# Patient Record
Sex: FEMALE | Race: WHITE | Employment: OTHER | URBAN - METROPOLITAN AREA
[De-identification: names, ages, dates, MRNs, and addresses within clinical notes are randomized per-mention and may not be internally consistent; named-entity substitution may affect disease eponyms.]

---

## 2023-03-19 ENCOUNTER — HOSPITAL ENCOUNTER (EMERGENCY)
Age: 68
Discharge: HOME | End: 2023-03-20
Attending: STUDENT IN AN ORGANIZED HEALTH CARE EDUCATION/TRAINING PROGRAM
Payer: MEDICARE

## 2023-03-19 VITALS
OXYGEN SATURATION: 100 % | WEIGHT: 200 LBS | TEMPERATURE: 98 F | HEIGHT: 64 IN | DIASTOLIC BLOOD PRESSURE: 92 MMHG | BODY MASS INDEX: 34.15 KG/M2 | RESPIRATION RATE: 20 BRPM | HEART RATE: 100 BPM | SYSTOLIC BLOOD PRESSURE: 165 MMHG

## 2023-03-19 DIAGNOSIS — I88.9 LYMPHADENITIS: Primary | ICD-10-CM

## 2023-03-19 PROCEDURE — 99283 EMERGENCY DEPT VISIT LOW MDM: CPT

## 2023-03-19 RX ORDER — ATENOLOL 50 MG/1
50 TABLET ORAL DAILY
COMMUNITY
Start: 2023-01-18

## 2023-03-19 RX ORDER — POTASSIUM CHLORIDE 750 MG/1
10 TABLET, FILM COATED, EXTENDED RELEASE ORAL DAILY
COMMUNITY
Start: 2023-01-18

## 2023-03-19 RX ORDER — HYDROCHLOROTHIAZIDE 25 MG/1
TABLET ORAL
COMMUNITY
Start: 2023-01-18

## 2023-03-20 PROCEDURE — 74011250637 HC RX REV CODE- 250/637: Performed by: STUDENT IN AN ORGANIZED HEALTH CARE EDUCATION/TRAINING PROGRAM

## 2023-03-20 RX ORDER — DOXYCYCLINE 100 MG/1
100 CAPSULE ORAL
Status: COMPLETED | OUTPATIENT
Start: 2023-03-20 | End: 2023-03-20

## 2023-03-20 RX ORDER — DOXYCYCLINE HYCLATE 100 MG
100 TABLET ORAL 2 TIMES DAILY
Qty: 14 TABLET | Refills: 0 | Status: SHIPPED | OUTPATIENT
Start: 2023-03-20 | End: 2023-03-27

## 2023-03-20 RX ORDER — FLUTICASONE PROPIONATE 50 MCG
2 SPRAY, SUSPENSION (ML) NASAL DAILY
Qty: 1 EACH | Refills: 0 | Status: SHIPPED | OUTPATIENT
Start: 2023-03-20 | End: 2023-03-25

## 2023-03-20 RX ADMIN — DOXYCYCLINE HYCLATE 100 MG: 100 CAPSULE ORAL at 00:22

## 2023-03-20 NOTE — ED PROVIDER NOTES
[facility] EMERGENCY DEPARTMENT  EMERGENCY DEPARTMENT HISTORY AND PHYSICAL EXAM      Date: 3/19/2023  Patient Name: Skinny Lee  MRN: [id number]  [place]: 1955  Date of evaluation: 3/19/2023  Provider: Hesham Avendaño MD   Note Started: 12:30 AM 3/20/23    HISTORY OF PRESENT ILLNESS     Chief Complaint   Patient presents with    Sinus Pain       History Provided By: Patient    HPI: Skinny Lee, 75 y.o. female presents emergency department for evaluation of left-sided cervical lymphadenopathy. Patient states over the last days she has noticed worsening nasal drainage, left-sided nasal congestion, this evening started feeling some swelling to her left anterior cervical lymph nodes. Patient denies any fevers chills denies any pain on swallowing, no ear pain. PAST MEDICAL HISTORY   Past Medical History:  Past Medical History:   Diagnosis Date    HTN (hypertension)        Past Surgical History:  History reviewed. No pertinent surgical history. Family History:  History reviewed. No pertinent family history. Social History:  Social History     Tobacco Use    Smoking status: Never    Smokeless tobacco: Never   Substance Use Topics    Alcohol use: Never    Drug use: Never       Allergies: Allergies   Allergen Reactions    Bactrim [Sulfamethoprim] Rash    Pcn [Penicillins] Rash       PCP: None    Current Meds:   Discharge Medication List as of 3/20/2023 12:38 AM        CONTINUE these medications which have NOT CHANGED    Details   atenoloL (TENORMIN) 50 mg tablet Take 50 mg by mouth daily. , Historical Med      hydroCHLOROthiazide (HYDRODIURIL) 25 mg tablet TAKE 1 TABLET BY MOUTH EVERY DAY IN THE MORNING OR AS DIRECTED, Historical Med      potassium chloride SR (KLOR-CON 10) 10 mEq tablet Take 10 mEq by mouth daily. , Historical Med             REVIEW OF SYSTEMS   Review of Systems   Constitutional:  Negative for activity change, appetite change, chills and fever.    HENT:  Positive for sinus pressure and sinus pain. Negative for ear pain, rhinorrhea, sore throat and trouble swallowing. Eyes:  Negative for photophobia and visual disturbance. Respiratory:  Negative for cough and shortness of breath. Musculoskeletal:  Negative for arthralgias and myalgias. Skin:  Negative for color change and pallor. Neurological:  Negative for dizziness, weakness, numbness and headaches. Hematological:  Positive for adenopathy. Positives and Pertinent negatives as per HPI. PHYSICAL EXAM     ED Triage Vitals [03/19/23 2353]   ED Encounter Vitals Group      BP (!) 165/92      Pulse (Heart Rate) 100      Resp Rate 20      Temp 98 °F (36.7 °C)      Temp src       O2 Sat (%) 100 %      Weight 200 lb      Height 5' 4\"      Physical Exam  Vitals and nursing note reviewed. Constitutional:       Appearance: Normal appearance. HENT:      Head: Normocephalic and atraumatic. Right Ear: Tympanic membrane normal.      Left Ear: Tympanic membrane normal.      Nose: Nose normal.      Mouth/Throat:      Mouth: Mucous membranes are moist.      Pharynx: Oropharynx is clear. No oropharyngeal exudate or posterior oropharyngeal erythema. Eyes:      Extraocular Movements: Extraocular movements intact. Neck:     Cardiovascular:      Rate and Rhythm: Normal rate. Pulmonary:      Effort: Pulmonary effort is normal.   Abdominal:      General: Abdomen is flat. Musculoskeletal:         General: No swelling or deformity. Normal range of motion. Cervical back: Normal range of motion. Lymphadenopathy:      Cervical: Cervical adenopathy present. Skin:     General: Skin is warm. Neurological:      General: No focal deficit present. Mental Status: She is alert. Mental status is at baseline. SCREENINGS               No data recorded        LAB, EKG AND DIAGNOSTIC RESULTS   Labs:  No results found for this or any previous visit (from the past 12 hour(s)).         Radiologic Studies:  Non-plain film images such as CT, Ultrasound and MRI are read by the radiologist. Plain radiographic images are visualized and preliminarily interpreted by the ED Provider with the below findings:    *    Interpretation per the Radiologist below, if available at the time of this note:  No results found. EKG: interpreted by myself    PROCEDURES   Unless otherwise noted below, none. Performed by: Concepción Huggins MD   Procedures      CRITICAL CARE TIME       ED COURSE and DIFFERENTIAL DIAGNOSIS/MDM   Vitals:    Vitals:    03/19/23 2353   BP: (!) 165/92   Pulse: 100   Resp: 20   Temp: 98 °F (36.7 °C)   SpO2: 100%   Weight: 90.7 kg (200 lb)   Height: 5' 4\" (1.626 m)        Patient was given the following medications:  Medications   doxycycline (VIBRAMYCIN) capsule 100 mg (100 mg Oral Given 3/20/23 0022)             Records Reviewed (source and summary of external notes): None    CC/HPI Summary, DDx, ED Course, and Reassessment: 54-year-old female no significant past medical history presents emergency department for left-sided cervical lymphadenopathy, nasal drainage. Physical shows well-appearing female no distress afebrile, clear oropharynx, left anterior cervical lymphadenopathy noted,    Differential diagnosis includes dental infection, cellulitis of scalp and/or neck, pharyngitis. No obvious nidus of infection however given size of lymph node we will treat for lymphadenities, will provide patient with doxycycline as she is complaining of some nasal discharge and sinus pain, additionally will prescribe Flonase to help with congestion. Instructed to follow-up with primary care physician next week if swelling does not improve. Disposition Considerations (Tests not done, Shared Decision Making, Pt Expectation of Test or Treatment.):      FINAL IMPRESSION     1. Lymphadenitis          DISPOSITION/PLAN   Discharged    Discharge Note: The patient is stable for discharge home.  The signs, symptoms, diagnosis, and discharge instructions have been discussed, understanding conveyed, and agreed upon. The patient is to follow up as recommended or return to ER should their symptoms worsen. PATIENT REFERRED TO:  Follow-up Information       Follow up With Specialties Details Why Contact Info    Your primary care physician  In 3 days If symptoms worsen               DISCHARGE MEDICATIONS:  Discharge Medication List as of 3/20/2023 12:38 AM        START taking these medications    Details   doxycycline (VIBRA-TABS) 100 mg tablet Take 1 Tablet by mouth two (2) times a day for 7 days. , Normal, Disp-14 Tablet, R-0      fluticasone propionate (FLONASE) 50 mcg/actuation nasal spray 2 Sprays by Both Nostrils route daily for 5 days. , Normal, Disp-1 Each, R-0           CONTINUE these medications which have NOT CHANGED    Details   atenoloL (TENORMIN) 50 mg tablet Take 50 mg by mouth daily. , Historical Med      hydroCHLOROthiazide (HYDRODIURIL) 25 mg tablet TAKE 1 TABLET BY MOUTH EVERY DAY IN THE MORNING OR AS DIRECTED, Historical Med      potassium chloride SR (KLOR-CON 10) 10 mEq tablet Take 10 mEq by mouth daily. , Historical Med               DISCONTINUED MEDICATIONS:  Discharge Medication List as of 3/20/2023 12:38 AM          I am the Primary Clinician of Record: Scott Sepulveda MD (electronically signed)    (Please note that parts of this dictation were completed with voice recognition software. Quite often unanticipated grammatical, syntax, homophones, and other interpretive errors are inadvertently transcribed by the computer software. Please disregards these errors.  Please excuse any errors that have escaped final proofreading.)